# Patient Record
Sex: MALE | Race: WHITE | Employment: PART TIME | URBAN - METROPOLITAN AREA
[De-identification: names, ages, dates, MRNs, and addresses within clinical notes are randomized per-mention and may not be internally consistent; named-entity substitution may affect disease eponyms.]

---

## 2022-12-12 ENCOUNTER — HOSPITAL ENCOUNTER (EMERGENCY)
Age: 52
Discharge: HOME | End: 2022-12-12
Attending: EMERGENCY MEDICINE
Payer: COMMERCIAL

## 2022-12-12 VITALS
BODY MASS INDEX: 16.47 KG/M2 | SYSTOLIC BLOOD PRESSURE: 134 MMHG | DIASTOLIC BLOOD PRESSURE: 80 MMHG | TEMPERATURE: 98 F | HEIGHT: 75 IN | OXYGEN SATURATION: 97 % | RESPIRATION RATE: 20 BRPM | WEIGHT: 132.5 LBS | HEART RATE: 80 BPM

## 2022-12-12 DIAGNOSIS — F41.9 ANXIETY: Primary | ICD-10-CM

## 2022-12-12 PROCEDURE — 6370000000 HC RX 637 (ALT 250 FOR IP): Performed by: EMERGENCY MEDICINE

## 2022-12-12 PROCEDURE — 99283 EMERGENCY DEPT VISIT LOW MDM: CPT

## 2022-12-12 RX ORDER — HYDROXYZINE PAMOATE 25 MG/1
50 CAPSULE ORAL ONCE
Status: COMPLETED | OUTPATIENT
Start: 2022-12-12 | End: 2022-12-12

## 2022-12-12 RX ORDER — HYDROXYZINE HYDROCHLORIDE 25 MG/1
25 TABLET, FILM COATED ORAL EVERY 6 HOURS PRN
Qty: 20 TABLET | Refills: 0 | Status: SHIPPED | OUTPATIENT
Start: 2022-12-12 | End: 2022-12-17

## 2022-12-12 RX ADMIN — HYDROXYZINE PAMOATE 50 MG: 25 CAPSULE ORAL at 21:21

## 2022-12-14 ASSESSMENT — ENCOUNTER SYMPTOMS
SHORTNESS OF BREATH: 0
CHEST TIGHTNESS: 0

## 2022-12-14 NOTE — ED PROVIDER NOTES
[address] [facility]  EMERGENCY DEPARTMENTENCOUNTER      Pt Name: Genaro Mix  MRN: [id number]  [place] 1970  Date ofevaluation: 12/12/2022  Provider: Judy Alba MD    CHIEF COMPLAINT       Chief Complaint   Patient presents with    Anxiety     Pt feeling anxious  this past week         HISTORY OF PRESENT ILLNESS   (Location/Symptom, Timing/Onset,Context/Setting, Quality, Duration, Modifying Factors, Severity)  Note limiting factors. Genaro Mix is a 47 y.o. male  who  has no past medical history on file. who presents to the emergency department for evaluation of anxiety and paranoia. Patient reports that he was concerned because he saw people walking around outside of his apartment and states that he saw the same car drive-by multiple times. He states that he is having intrusive thoughts that someone was following him. Patient acknowledges that he does feel paranoid. States is not current taking medications or seeing any for mental health professionals. Denies illicit drug use. Denies thoughts of self-harm or harm to others. He denies auditory or visual hallucinations. He states that he felt anxious about what he saw earlier and did not want to go back to his apartment. HPI    NursingNotes were reviewed. REVIEW OF SYSTEMS    (2-9 systems for level 4, 10 or more for level 5)     Review of Systems   Constitutional:  Negative for fever. Respiratory:  Negative for chest tightness and shortness of breath. Psychiatric/Behavioral:  Positive for sleep disturbance. Negative for hallucinations, self-injury and suicidal ideas. The patient is nervous/anxious. The patient is not hyperactive. Except as noted above the remainder of the review of systems was reviewed and negative. PAST MEDICAL HISTORY   History reviewed. No pertinent past medical history.       SURGICALHISTORY       Past Surgical History:   Procedure Laterality Date    LEG SURGERY           CURRENT MEDICATIONS       Discharge Medication List as of 12/12/2022 10:25 PM               Penicillins    FAMILY HISTORY     History reviewed. No pertinent family history. SOCIAL HISTORY       Social History     Socioeconomic History    Marital status: Single     Spouse name: None    Number of children: None    Years of education: None    Highest education level: None   Tobacco Use    Smoking status: Every Day     Packs/day: 1.00     Types: Cigarettes   Substance and Sexual Activity    Alcohol use: Yes     Comment: occ    Drug use: No       SCREENINGS             PHYSICAL EXAM    (up to 7 for level 4, 8 or more for level 5)     ED Triage Vitals [12/12/22 2039]   BP Temp Temp Source Heart Rate Resp SpO2 Height Weight   (!) 158/83 98 °F (36.7 °C) Oral (!) 104 18 97 % 6' 2.5\" (1.892 m) 132 lb 8 oz (60.1 kg)       Physical Exam  Vitals reviewed. Constitutional:       General: He is not in acute distress. Appearance: He is well-developed. HENT:      Head: Normocephalic and atraumatic. Eyes:      Conjunctiva/sclera: Conjunctivae normal.   Neck:      Trachea: No tracheal deviation. Cardiovascular:      Rate and Rhythm: Normal rate and regular rhythm. Pulmonary:      Effort: Pulmonary effort is normal.      Breath sounds: Normal breath sounds. No wheezing or rales. Abdominal:      General: There is no distension. Palpations: Abdomen is soft. Tenderness: There is no abdominal tenderness. Musculoskeletal:         General: No deformity. Normal range of motion. Cervical back: Normal range of motion. Skin:     General: Skin is warm and dry. Capillary Refill: Capillary refill takes less than 2 seconds. Neurological:      Mental Status: He is alert and oriented to person, place, and time. Sensory: No sensory deficit. Motor: No weakness.       Gait: Gait normal.       RESULTS     EKG: All EKG's are interpreted by the Emergency Department Physician who either signs or Co-signsthis chart in the absence of a cardiologist.      RADIOLOGY:   [place] such as CT, Ultrasound and MRI are read by the radiologist. Plain radiographic images are visualized and preliminarily interpreted by the emergency physician with the below findings:      Interpretation per the Radiologist below, if available at the time ofthis note:    No orders to display         ED BEDSIDE ULTRASOUND:   Performed by ED Physician - none    LABS:  Labs Reviewed - No data to display    All other labs were within normal range or not returned as of this dictation. EMERGENCY DEPARTMENT COURSE and DIFFERENTIAL DIAGNOSIS/MDM:   Vitals:    Vitals:    12/12/22 2039 12/12/22 2222   BP: (!) 158/83 134/80   Pulse: (!) 104 80   Resp: 18 20   Temp: 98 °F (36.7 °C)    TempSrc: Oral    SpO2: 97% 97%   Weight: 132 lb 8 oz (60.1 kg)    Height: 6' 2.5\" (1.892 m)        Patient was given thefollowing medications:  Medications   hydrOXYzine pamoate (VISTARIL) capsule 50 mg (50 mg Oral Given 12/12/22 2121)       ED COURSE & MEDICAL DECISION MAKING    Pertinent Labs & Imaging studies reviewed. (See chart for details)   -  Patient seen and evaluated in the emergency department. -  Triage and nursing notes reviewed and incorporated. -  Old chart records reviewed and incorporated. -  Differential diagnosis includes: Differential diagnoses: Drug or alcohol use or abuse, psychosis, behavioral mental illness, metabolic disturbance, infection, neurologic emergency, other    -  Work-up included:  See above  -  ED treatment included: See above  -  Results discussed with patient. 52-year-old male presents to the ED for evaluation of paranoia. Reviewing his medical record he been seen at multiple emergency departments for paranoid previously but does not carry a mental health diagnosis. He states that he was taking medication previously was unsure of the name of it. He denies auditory visual hallucinations.   He reports he does feel safe at home but is anxious to go back home because he he saw the same car passed by his apartment multiple times. Patient amenable to trying hydroxyzine to see if it would help with symptoms. On reevaluation he states that he does feel somewhat improved. Discussed with patient when I feels safe with discharge home, or if he feels like he would benefit from hospitalization for mental health evaluation. Patient reports he does feel safe with discharge home and is declining admission. Patient not appear to be a harm to himself or others currently and he believe he is safe for discharge home. patient feels improved on reevaluation. Symptomatic treatment with expectant management discussed with the patient and they and/or family members present are amenable to treatment plan and outpatient follow-up. Strict return precautions were discussed with the patient and those present. They demonstrated understanding of when to return to the emergency department for new or worsening symptoms. .  The patient is agreeable with plan of care and disposition. Is this patient to be included in the SEP-1 Core Measure due to severe sepsis or septic shock? No   Exclusion criteria - the patient is NOT to be included for SEP-1 Core Measure due to: Infection is not suspected      REASSESSMENT          CRITICAL CARE TIME   Total Critical Care time was 0 minutes, excluding separately reportable procedures. There was a high probability of clinically significant/life threatening deterioration in the patient's condition which required my urgent intervention. CONSULTS:  None    PROCEDURES:  Unless otherwise noted below, none     Procedures    FINAL IMPRESSION      1.  Anxiety          DISPOSITION/PLAN   DISPOSITION Decision To Discharge 12/12/2022 09:46:49 PM      PATIENT REFERREDTO:  [address] and [address] [facility]  Call [phone]  Call   As needed    [address]  Call: [phone]  Schedule an appointment as soon as possible for a visit       [address] [place] [address] Pre-Services  [phone]          DISCHARGEMEDICATIONS:  Discharge Medication List as of 12/12/2022 10:25 PM        START taking these medications    Details   hydrOXYzine HCl (ATARAX) 25 MG tablet Take 1 tablet by mouth every 6 hours as needed for Itching, Disp-20 tablet, R-0Print                (Please note that portions of this note were completed with a voice recognition program.  Efforts were made to edit the dictations but occasionally words are mis-transcribed.)    Stanley Nieves MD (electronically signed)  Attending Emergency Physician         Stanley Nieves MD  12/14/22 3848

## 2022-12-17 ENCOUNTER — HOSPITAL ENCOUNTER (EMERGENCY)
Age: 52
Discharge: HOME | End: 2022-12-17
Attending: EMERGENCY MEDICINE
Payer: COMMERCIAL

## 2022-12-17 VITALS
RESPIRATION RATE: 13 BRPM | TEMPERATURE: 97.5 F | SYSTOLIC BLOOD PRESSURE: 104 MMHG | HEIGHT: 75 IN | BODY MASS INDEX: 16.78 KG/M2 | DIASTOLIC BLOOD PRESSURE: 60 MMHG | HEART RATE: 77 BPM | WEIGHT: 135 LBS | OXYGEN SATURATION: 97 %

## 2022-12-17 DIAGNOSIS — F41.1 ANXIETY STATE: Primary | ICD-10-CM

## 2022-12-17 LAB
A/G RATIO: 2.4 (ref 1.1–2.2)
ACETAMINOPHEN LEVEL: <5 UG/ML (ref 10–30)
ALBUMIN SERPL-MCNC: 4.4 G/DL (ref 3.4–5)
ALP BLD-CCNC: 72 U/L (ref 40–129)
ALT SERPL-CCNC: 24 U/L (ref 10–40)
AMPHETAMINE SCREEN, URINE: NORMAL
ANION GAP SERPL CALCULATED.3IONS-SCNC: 13 MMOL/L (ref 3–16)
AST SERPL-CCNC: 21 U/L (ref 15–37)
BARBITURATE SCREEN URINE: NORMAL
BASOPHILS ABSOLUTE: 0 K/UL (ref 0–0.2)
BASOPHILS RELATIVE PERCENT: 0.7 %
BENZODIAZEPINE SCREEN, URINE: NORMAL
BILIRUB SERPL-MCNC: 0.3 MG/DL (ref 0–1)
BILIRUBIN URINE: NEGATIVE
BLOOD, URINE: NEGATIVE
BUN BLDV-MCNC: 16 MG/DL (ref 7–20)
CALCIUM SERPL-MCNC: 9.1 MG/DL (ref 8.3–10.6)
CANNABINOID SCREEN URINE: NORMAL
CHLORIDE BLD-SCNC: 97 MMOL/L (ref 99–110)
CLARITY: CLEAR
CO2: 26 MMOL/L (ref 21–32)
COCAINE METABOLITE SCREEN URINE: NORMAL
COLOR: YELLOW
CREAT SERPL-MCNC: 0.9 MG/DL (ref 0.9–1.3)
EOSINOPHILS ABSOLUTE: 0 K/UL (ref 0–0.6)
EOSINOPHILS RELATIVE PERCENT: 0.7 %
ETHANOL: NORMAL MG/DL (ref 0–0.08)
FENTANYL SCREEN, URINE: NORMAL
GFR SERPL CREATININE-BSD FRML MDRD: >60 ML/MIN/{1.73_M2}
GLUCOSE BLD-MCNC: 124 MG/DL (ref 70–99)
GLUCOSE URINE: NEGATIVE MG/DL
HCT VFR BLD CALC: 36.2 % (ref 40.5–52.5)
HEMOGLOBIN: 12.3 G/DL (ref 13.5–17.5)
KETONES, URINE: NEGATIVE MG/DL
LEUKOCYTE ESTERASE, URINE: NEGATIVE
LYMPHOCYTES ABSOLUTE: 1.1 K/UL (ref 1–5.1)
LYMPHOCYTES RELATIVE PERCENT: 20.7 %
Lab: NORMAL
MAGNESIUM: 2.1 MG/DL (ref 1.8–2.4)
MCH RBC QN AUTO: 33.2 PG (ref 26–34)
MCHC RBC AUTO-ENTMCNC: 34 G/DL (ref 31–36)
MCV RBC AUTO: 97.6 FL (ref 80–100)
METHADONE SCREEN, URINE: NORMAL
MICROSCOPIC EXAMINATION: NORMAL
MONOCYTES ABSOLUTE: 0.4 K/UL (ref 0–1.3)
MONOCYTES RELATIVE PERCENT: 8 %
NEUTROPHILS ABSOLUTE: 3.7 K/UL (ref 1.7–7.7)
NEUTROPHILS RELATIVE PERCENT: 69.9 %
NITRITE, URINE: NEGATIVE
OPIATE SCREEN URINE: NORMAL
OXYCODONE URINE: NORMAL
PDW BLD-RTO: 13.6 % (ref 12.4–15.4)
PH UA: 5.5 (ref 5–8)
PH UA: 6
PHENCYCLIDINE SCREEN URINE: NORMAL
PLATELET # BLD: 237 K/UL (ref 135–450)
PMV BLD AUTO: 7.3 FL (ref 5–10.5)
POTASSIUM REFLEX MAGNESIUM: 3.3 MMOL/L (ref 3.5–5.1)
PROTEIN UA: NEGATIVE MG/DL
RBC # BLD: 3.7 M/UL (ref 4.2–5.9)
SALICYLATE, SERUM: <0.3 MG/DL (ref 15–30)
SARS-COV-2, NAAT: NOT DETECTED
SODIUM BLD-SCNC: 136 MMOL/L (ref 136–145)
SPECIFIC GRAVITY UA: 1.02 (ref 1–1.03)
TOTAL PROTEIN: 6.2 G/DL (ref 6.4–8.2)
URINE REFLEX TO CULTURE: NORMAL
URINE TYPE: NORMAL
UROBILINOGEN, URINE: 0.2 E.U./DL
WBC # BLD: 5.3 K/UL (ref 4–11)

## 2022-12-17 PROCEDURE — 85025 COMPLETE CBC W/AUTO DIFF WBC: CPT

## 2022-12-17 PROCEDURE — 80143 DRUG ASSAY ACETAMINOPHEN: CPT

## 2022-12-17 PROCEDURE — 82077 ASSAY SPEC XCP UR&BREATH IA: CPT

## 2022-12-17 PROCEDURE — 99283 EMERGENCY DEPT VISIT LOW MDM: CPT

## 2022-12-17 PROCEDURE — 6370000000 HC RX 637 (ALT 250 FOR IP): Performed by: PHYSICIAN ASSISTANT

## 2022-12-17 PROCEDURE — 83735 ASSAY OF MAGNESIUM: CPT

## 2022-12-17 PROCEDURE — 80307 DRUG TEST PRSMV CHEM ANLYZR: CPT

## 2022-12-17 PROCEDURE — 80053 COMPREHEN METABOLIC PANEL: CPT

## 2022-12-17 PROCEDURE — 87635 SARS-COV-2 COVID-19 AMP PRB: CPT

## 2022-12-17 PROCEDURE — 81003 URINALYSIS AUTO W/O SCOPE: CPT

## 2022-12-17 PROCEDURE — 80179 DRUG ASSAY SALICYLATE: CPT

## 2022-12-17 RX ORDER — RISPERIDONE 2 MG/1
2 TABLET ORAL NIGHTLY
COMMUNITY
Start: 2022-12-13 | End: 2022-12-17

## 2022-12-17 RX ORDER — HYDROXYZINE PAMOATE 50 MG/1
50 CAPSULE ORAL EVERY 6 HOURS
Qty: 120 CAPSULE | Refills: 0 | Status: SHIPPED | OUTPATIENT
Start: 2022-12-17 | End: 2023-01-16

## 2022-12-17 RX ORDER — RISPERIDONE 1 MG/1
2 TABLET ORAL ONCE
Status: COMPLETED | OUTPATIENT
Start: 2022-12-17 | End: 2022-12-17

## 2022-12-17 RX ORDER — RISPERIDONE 2 MG/1
2 TABLET ORAL NIGHTLY
Qty: 30 TABLET | Refills: 0 | Status: SHIPPED | OUTPATIENT
Start: 2022-12-17

## 2022-12-17 RX ORDER — HYDROXYZINE PAMOATE 25 MG/1
25 CAPSULE ORAL 3 TIMES DAILY PRN
COMMUNITY
Start: 2022-04-23 | End: 2022-12-17

## 2022-12-17 RX ADMIN — RISPERIDONE 2 MG: 1 TABLET ORAL at 08:40

## 2022-12-17 ASSESSMENT — ENCOUNTER SYMPTOMS
COUGH: 0
SHORTNESS OF BREATH: 0
NAUSEA: 0
VOMITING: 0

## 2022-12-17 ASSESSMENT — LIFESTYLE VARIABLES
HOW OFTEN DO YOU HAVE A DRINK CONTAINING ALCOHOL: NEVER
HOW MANY STANDARD DRINKS CONTAINING ALCOHOL DO YOU HAVE ON A TYPICAL DAY: PATIENT DOES NOT DRINK

## 2022-12-17 ASSESSMENT — PAIN - FUNCTIONAL ASSESSMENT
PAIN_FUNCTIONAL_ASSESSMENT: NONE - DENIES PAIN
PAIN_FUNCTIONAL_ASSESSMENT: NONE - DENIES PAIN

## 2022-12-17 NOTE — VIRTUAL HEALTH
[place] Consult to Tele-Psych  Consult performed by: DANIELA Edmond - CNP  Consult ordered by: Poonam, Barbara [facility] EVALUATION    Date of Service: 12/17/2022    Referral Source: Angi Leelaabel, [address] (ED staff)  History  From: Patient  Record Review: moderate      CC: \"I just have so much anxiety, man. \"     HPI:   The patient is a 47 y.o. male who presents to the Emergency Department for severe anxiety. Reports that he's suffered from anxiety for the past 1-1.5 years but does not elaborate on any specific events that took place at that time as being the catalyst for his anxiety. He presented to ED at [facility] on 12/13/2022 and did describe feeling paranoid and as though \"someone\" was after him, but did not specify anyone specifically. He does not mention any paranoia today and only describes anxiety being his primary symptom. He explains that he was discharged from [address] [place] [address] ED with Risperdal 2mg qhs for his paranoia and Vistaril 25mg q 6 hours for anxiety. He was, however, evidently unable to get this script filled because his insurance would not fill something that came from an ED rather than a primary care doctor. Upon contacting his insurance through [place], they apparently recommended that obtain transportation to the ED in order to be evaluated for his anxiety. SI: Denies; reports that he has no interest in harming himself or anyone else and is only interested in having his anxiety reduced. HI: Denies  AH/VH: Denies    Pt endorse seizures when he was using alcohol and other drugs in 2018 but not since that time, denies TBIs, Hep C or HIV  No TD noted, AIMS=0    Psychiatric history:  Prior psychiatric dx: Reports that he's never received treatment for his symptoms on an outpatient basis. Admits that it might be good for him.    Outpatient psychiatric provider: He was apparently assessed at [facility] mid-December, 2022; however, they apparently informed him that it would be at least 30 days before he could be assessed by a counselor there. Suicide attempts: Denies/none per history  Inpatient psychiatric admissions: Believes that he had one at [facility] [place] [place] in 2018 when he was using alcohol. This was right before he went into rehab for his alcoholism. Past psychiatric medication trials: Only mentions that he's used Vistaril before and that it never worked for him. Violence/aggression: Denies/no history noted. Prior ANTON treatment: Rehab in 2018. Reports that he's been clean from substances since that time. Family history:   Family history of suicide attempt: Reports that he was adopted upon birth and that it was a closed adopted. He is unable to provide any information pertaining to his family's medical history. Family history of mental illness: See above. Substance Abuse History (over the past 12 months, unless otherwise specified): Tobacco: Endorses . 5 pack a day  Caffeine: Reports that he drinks 1-2 cups of coffee a day. Will also sometimes drink an energy drink. Alcohol: Denies  Marijuana: Denies  Cocaine: Denies  Opiates: Denies  Benzodiazepine: Denies  Other illicit drug usage: Denies    Social History:  Childhood: Describes his childhood as pleasant. He was adopted at birth. Psychological trauma or Abuse: Denies   Living Situation/Interest: States that he lives in an apartment in [facility]. 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 At time of assessment, he's not working but reports that he intends to obtain a job and is reportedly in the process of passing a drug test for said company. Legal History: Unremarkable  Spiritual History: Did not elaborate    Allergies: Allergies   Allergen Reactions    Penicillins         Past Medical History:      History reviewed. No pertinent past medical history.     Past Surgical History:      Past Surgical History: Procedure Laterality Date    LEG SURGERY         Current medications:    No current facility-administered medications for this encounter.     Current Outpatient Medications:     risperiDONE (RISPERDAL) 2 MG tablet, Take 2 mg by mouth nightly, Disp: , Rfl:     hydrOXYzine HCl (ATARAX) 25 MG tablet, Take 1 tablet by mouth every 6 hours as needed for Itching, Disp: 20 tablet, Rfl: 0      Vital signs:   Vitals:    12/17/22 1300   BP: 101/63   Pulse: 70   Resp: 13   Temp:    SpO2: 97%         Labs:   Recent Results (from the past 48 hour(s))   CBC with Auto Differential    Collection Time: 12/17/22  7:19 AM   Result Value Ref Range    WBC 5.3 4.0 - 11.0 K/uL    RBC 3.70 (L) 4.20 - 5.90 M/uL    Hemoglobin 12.3 (L) 13.5 - 17.5 g/dL    Hematocrit 36.2 (L) 40.5 - 52.5 %    MCV 97.6 80.0 - 100.0 fL    MCH 33.2 26.0 - 34.0 pg    MCHC 34.0 31.0 - 36.0 g/dL    RDW 13.6 12.4 - 15.4 %    Platelets 299 118 - 920 K/uL    MPV 7.3 5.0 - 10.5 fL    Neutrophils % 69.9 %    Lymphocytes % 20.7 %    Monocytes % 8.0 %    Eosinophils % 0.7 %    Basophils % 0.7 %    Neutrophils Absolute 3.7 1.7 - 7.7 K/uL    Lymphocytes Absolute 1.1 1.0 - 5.1 K/uL    Monocytes Absolute 0.4 0.0 - 1.3 K/uL    Eosinophils Absolute 0.0 0.0 - 0.6 K/uL    Basophils Absolute 0.0 0.0 - 0.2 K/uL   Comprehensive Metabolic Panel w/ Reflex to MG    Collection Time: 12/17/22  7:19 AM   Result Value Ref Range    Sodium 136 136 - 145 mmol/L    Potassium reflex Magnesium 3.3 (L) 3.5 - 5.1 mmol/L    Chloride 97 (L) 99 - 110 mmol/L    CO2 26 21 - 32 mmol/L    Anion Gap 13 3 - 16    Glucose 124 (H) 70 - 99 mg/dL    BUN 16 7 - 20 mg/dL    Creatinine 0.9 0.9 - 1.3 mg/dL    Est, Glom Filt Rate >60 >60    Calcium 9.1 8.3 - 10.6 mg/dL    Total Protein 6.2 (L) 6.4 - 8.2 g/dL    Albumin 4.4 3.4 - 5.0 g/dL    Albumin/Globulin Ratio 2.4 (H) 1.1 - 2.2    Total Bilirubin 0.3 0.0 - 1.0 mg/dL    Alkaline Phosphatase 72 40 - 129 U/L    ALT 24 10 - 40 U/L    AST 21 15 - 37 U/L   Ethanol Collection Time: 12/17/22  7:19 AM   Result Value Ref Range    Ethanol Lvl None Detected mg/dL   Salicylate    Collection Time: 12/17/22  7:19 AM   Result Value Ref Range    Salicylate, Serum <4.8 (L) 15.0 - 30.0 mg/dL   Acetaminophen Level    Collection Time: 12/17/22  7:19 AM   Result Value Ref Range    Acetaminophen Level <5 (L) 10 - 30 ug/mL   Magnesium    Collection Time: 12/17/22  7:19 AM   Result Value Ref Range    Magnesium 2.10 1.80 - 2.40 mg/dL   Urine Drug Screen    Collection Time: 12/17/22  9:01 AM   Result Value Ref Range    Amphetamine Screen, Urine Neg Negative <1000ng/mL    Barbiturate Screen, Ur Neg Negative <200 ng/mL    Benzodiazepine Screen, Urine Neg Negative <200 ng/mL    Cannabinoid Scrn, Ur Neg Negative <50 ng/mL    Cocaine Metabolite Screen, Urine Neg Negative <300 ng/mL    Opiate Scrn, Ur Neg Negative <300 ng/mL    PCP Screen, Urine Neg Negative <25 ng/mL    Methadone Screen, Urine Neg Negative <300 ng/mL    Oxycodone Urine Neg Negative <100 ng/ml    FENTANYL SCREEN, URINE Neg Negative <5 ng/mL    pH, UA 6.0     Drug Screen Comment: see below    Urinalysis with Reflex to Culture    Collection Time: 12/17/22  9:01 AM    Specimen: Urine   Result Value Ref Range    Color, UA Yellow Straw/Yellow    Clarity, UA Clear Clear    Glucose, Ur Negative Negative mg/dL    Bilirubin Urine Negative Negative    Ketones, Urine Negative Negative mg/dL    Specific Gravity, UA 1.021 1.005 - 1.030    Blood, Urine Negative Negative    pH, UA 5.5 5.0 - 8.0    Protein, UA Negative Negative mg/dL    Urobilinogen, Urine 0.2 <2.0 E.U./dL    Nitrite, Urine Negative Negative    Leukocyte Esterase, Urine Negative Negative    Microscopic Examination Not Indicated     Urine Type NotGiven     Urine Reflex to Culture Not Indicated    COVID-19, Rapid    Collection Time: 12/17/22  9:01 AM    Specimen: Nasopharyngeal Swab   Result Value Ref Range    SARS-CoV-2, NAAT Not Detected Not Detected         MSE:   Sensorium  oriented to time, place and person   Orientation person, place, time/date, situation, day of week, month of year, and year   Relations cooperative   Eye Contact appropriate   Appearance:  age appropriate, bearded, disheveled, and thin & gaunt looking   Motor Behavior:  within normal limits   Speech:  normal pitch and normal volume   Vocabulary average   Thought Process: within normal limits   Thought Content normal    Suicidal ideations no plan , no intention, and none   Homicidal ideations no plan , no intention, and none   Mood:  anxious   Affect:  normal and mood-congruent   Memory recent  adequate   Memory remote:  adequate   Concentration:  adequate   Abstraction:  abstract   Insight:  fair   Reliability fair   Judgment:  fair         C-SSRS Suicide Screening 12/17/2022   1) Within the past month, have you wished you were dead or wished you could go to sleep and not wake up? No   2) Have you actually had any thoughts of killing yourself? No   6) Have you ever done anything, started to do anything, or prepared to do anything to end your life? No        Impression:     Patient does not meet criteria for inpatient hospitalization. Patient and case discussed with collaborating psychiatrist, Dr. Jeremy Curran and team is recommending discharge at this time. Plan:    Recommend discharge as patient does not meet inpatient criteria at this time  Recommend an increase in Vistaril 50mg q 6 hours. Risperdal 2mg 1 tab qhs can remain the same. If possible, recommend that ER provide the patient with a 30-day supply of these medications for him to use until he is established with outpatient treatment. The patient verbalized understanding and agreed with the treatment regimen as outlined above. Medical records, labs, diagnostic tests reviewed  Pt had an opportunity to ask questions and address concerns  Pt encouraged to continue outpatient therapy  Pt was in agreement with treatment plan.   Patient is ok to be dc from a psychiatric point of view when medically appropriate. Patient does not meet criteria for a psychiatric hold at this time  Patient is to follow up with outpatient psychiatry; please provide with patient with local resources as he needs to get established with psychiatric mental health treatment. Psychiatry will sign off  Thank you for this consult    Manus Constant was evaluated through a synchronous (real-time) audio-video encounter. The patient (or guardian if applicable) is aware that this is a billable service. Verbal consent to proceed has been obtained. Patient identification was verified, and a caregiver was present when appropriate. The patient was located at [facility] (Appt Department): [address]: [phone]    The provider was located at [place], [place]. Total time spent on this encounter: Not billed by time      --DANIELA Parker CNP on 12/17/2022 at 1:03 PM    An electronic signature was used to authenticate this note.

## 2022-12-17 NOTE — ED PROVIDER NOTES
Attending Supervisory Note/Shared Visit   I have personally performed a face to face diagnostic evaluation on this patient. I have reviewed the mid-levels findings and agree. History and Exam by me shows alert white male no acute distress. Presents complaining of anxiety. This patient has a previous history of alcohol dependency and drug use. Has not been using for several years. Moved from [place] to the [address] [place] [address] recently because of anxiety related to friend of his that was killed. [place] that he would be safer and [facility]. Unfortunately he has anxiety has not abated. He is paranoid. He feels like people are trying to get into his apartment. He has been seen several times for symptoms, most recently at [facility] on 12/13/2022. Apparently they prescribed some Risperdal, but for some reason related to his insurance being [facility] he was unable to get it filled. General: Alert male in no acute distress. Head: Atraumatic and normocephalic. Eyes: Pupils equal round reactive. Neck Dr. Movements are intact. ENT: Ma Junaid is clear. Oropharynx moist without erythema. Neck: Supple, nontender, no adenopathy. Heart: Regular rate and rhythm. No murmurs or gallops noted. Lungs: Breath sounds equal bilaterally and clear. Mental status: Alert, oriented. Paranoid. Reporting thoughts that people were trying to get into his apartment. At times he feels like he sees or hears things in his apartment, but seems to have an understanding that they are not really there. He is eating and drinking. His sleep patterns are fair. He states he is able to get some sleep. He is not suicidal homicidal.    Lab reviewed. Drug screen negative. Urinalysis is unremarkable. COVID-negative. Influenza negative. H&H of 12.3 and 36.2. White blood count 5300. Sodium 136 with a potassium of 3.3. BUN and creatinine are normal.  Glucose of 124. Liver enzymes are normal.  No alcohol detected. Salicylate level less than 0.3. Acetaminophen level less than 5. Telepsych evaluation has been completed. They recommend outpatient treatment with follow-up. We will prescribe Vistaril 50 mg every 6 hours and Risperdal 2 mg nightly per the recommendation. Outpatient follow-up per their recommendation.     1. Anxiety state      Disposition:  Discharge / Home      (Please note that portions of this note were completed with a voice recognition program.  Efforts were made to edit the dictations but occasionally words are mis-transcribed.)    Arneta Ormond, MD  Attending Emergency Physician       Milan Velazquez MD  12/17/22 1183

## 2022-12-17 NOTE — ED TRIAGE NOTES
Pt states he has anxiety, pt presents as fidgety and talkative.  Pt states tried to get rx filled for risperidone but wasn't able to get it at  pharmacy, needs to be written by MD.

## 2022-12-17 NOTE — ED PROVIDER NOTES
[address] [facility]        Pt Name: Yasemin Lopez  MRN: [id number]  [place] 1970  Date of evaluation: 12/17/2022  Provider: YOSHI Onofre  PCP: No primary care provider on file. Note Started: 8:14 AM EST 12/17/22       I have seen and evaluated this patient with my supervising physician Trista Travis MD.      CHIEF COMPLAINT       Chief Complaint   Patient presents with    Panic Attack     Pt arrived via ems stating \" having an anxiety attack. \"       HISTORY OF PRESENT ILLNESS: 1 or more Elements     History from : Patient    Limitations to history : None    Yasemin Lopez is a 47 y.o. male who presents to the emergency department today with concerns for paranoia, anxiety. Patient reports that he has been struggling with a lot of anxiety and panic for the last several months. He has been sober from alcohol, cocaine since about 2018. He states when he went to rehab he was taught several coping mechanisms, but thinks that over time things have kind of worn away and he is having a more difficult time coping. He states he went to psychiatric emergency services a few days ago and prescribed Risperdal, but when he tried to fill the prescription that was written by an [place] licensed provider in [place] he was unable to get it filled. He states he is not having thoughts of harming himself or others, but he is getting frustrated with the amount of anxiety that is increasing and his decreased ability to cope with it. He presents today, hopeful that he can get a prescription for the Risperdal, but ideally would prefer not to be admitted because he has a new job starting on Monday. He has no further complaints. Nursing Notes were all reviewed and agreed with or any disagreements were addressed in the HPI. REVIEW OF SYSTEMS :      Review of Systems   Constitutional:  Negative for chills and fever.    Respiratory:  Negative for cough and shortness of breath. Cardiovascular:  Negative for chest pain and palpitations. Gastrointestinal:  Negative for nausea and vomiting. Neurological:  Negative for dizziness and headaches. Psychiatric/Behavioral:  Negative for agitation, confusion, self-injury and suicidal ideas. The patient is nervous/anxious. The patient is not hyperactive. Positives and Pertinent negatives as per HPI. SURGICAL HISTORY     Past Surgical History:   Procedure Laterality Date    LEG SURGERY         CURRENTMEDICATIONS       Previous Medications    No medications on file       ALLERGIES     Penicillins    FAMILYHISTORY     History reviewed. No pertinent family history. SOCIAL HISTORY       Social History     Tobacco Use    Smoking status: Every Day     Packs/day: 1.00     Types: Cigarettes   Substance Use Topics    Alcohol use: Yes     Comment: occ    Drug use: No       SCREENINGS        [place] Coma Scale  Eye Opening: Spontaneous  Best Verbal Response: Oriented  Best Motor Response: Obeys commands  Perez Coma Scale Score: 15                CIWA Assessment  BP: 101/63  Heart Rate: 70           PHYSICAL EXAM  1 or more Elements     ED Triage Vitals [12/17/22 0616]   BP Temp Temp Source Heart Rate Resp SpO2 Height Weight   121/72 97.5 °F (36.4 °C) Oral 94 16 98 % 6' 2.5\" (1.892 m) 135 lb (61.2 kg)       Physical Exam  Vitals and nursing note reviewed. Constitutional:       General: He is not in acute distress. Appearance: Normal appearance. He is well-developed. He is not ill-appearing, toxic-appearing or diaphoretic. HENT:      Head: Normocephalic and atraumatic. Mouth/Throat:      Mouth: Mucous membranes are moist.      Pharynx: Oropharynx is clear. Eyes:      Conjunctiva/sclera: Conjunctivae normal.      Pupils: Pupils are equal, round, and reactive to light. Cardiovascular:      Rate and Rhythm: Normal rate and regular rhythm.    Pulmonary:      Effort: Pulmonary effort is normal. No respiratory distress. Breath sounds: Normal breath sounds. Abdominal:      General: Bowel sounds are normal. There is no distension. Palpations: Abdomen is soft. Tenderness: There is no abdominal tenderness. Musculoskeletal:      Cervical back: Normal range of motion and neck supple. Right lower leg: No edema. Left lower leg: No edema. Skin:     General: Skin is warm and dry. Neurological:      General: No focal deficit present. Mental Status: He is alert and oriented to person, place, and time. Psychiatric:         Attention and Perception: Attention and perception normal.         Mood and Affect: Mood is anxious. Affect is not labile, angry or inappropriate. Speech: Speech normal.         Behavior: Behavior normal. Behavior is not aggressive, withdrawn or hyperactive. Behavior is cooperative. Thought Content: Thought content is not delusional. Thought content does not include homicidal or suicidal ideation. Thought content does not include homicidal or suicidal plan. Cognition and Memory: Cognition and memory normal.         Judgment: Judgment normal. Judgment is not impulsive or inappropriate.          DIAGNOSTIC RESULTS   LABS:    Labs Reviewed   CBC WITH AUTO DIFFERENTIAL - Abnormal; Notable for the following components:       Result Value    RBC 3.70 (*)     Hemoglobin 12.3 (*)     Hematocrit 36.2 (*)     All other components within normal limits   COMPREHENSIVE METABOLIC PANEL W/ REFLEX TO MG FOR LOW K - Abnormal; Notable for the following components:    Potassium reflex Magnesium 3.3 (*)     Chloride 97 (*)     Glucose 124 (*)     Total Protein 6.2 (*)     Albumin/Globulin Ratio 2.4 (*)     All other components within normal limits   SALICYLATE LEVEL - Abnormal; Notable for the following components:    Salicylate, Serum <8.4 (*)     All other components within normal limits   ACETAMINOPHEN LEVEL - Abnormal; Notable for the following components: Acetaminophen Level <5 (*)     All other components within normal limits   COVID-19, RAPID   URINE DRUG SCREEN   URINALYSIS WITH REFLEX TO CULTURE   ETHANOL   MAGNESIUM       When ordered only abnormal lab results are displayed. All other labs were within normal range or not returned as of this dictation. EKG: When ordered, EKG's are interpreted by the Emergency Department Physician in the absence of a cardiologist.  Please see their note for interpretation of EKG. RADIOLOGY:   Non-plain film images such as CT, Ultrasound and MRI are read by the radiologist. Plain radiographic images are visualized and preliminarily interpreted by the ED Provider with the below findings:    Interpretation per the Radiologist below, if available at the time of this note:    No orders to display     No results found. No results found. PROCEDURES   Unless otherwise noted below, none     Procedures    PAST MEDICAL HISTORY      has no past medical history on file. EMERGENCY DEPARTMENT COURSE and DIFFERENTIAL DIAGNOSIS/MDM:   Vitals:    Vitals:    12/17/22 1130 12/17/22 1145 12/17/22 1200 12/17/22 1300   BP:  (!) 100/56 99/64 101/63   Pulse: 69 72 69 70   Resp: 13 17 11 13   Temp:       TempSrc:       SpO2: 95% 98% 99% 97%   Weight:       Height:           Patient was given the following medications:  Medications   risperiDONE (RISPERDAL) tablet 2 mg (2 mg Oral Given 12/17/22 0840)             Is this patient to be included in the SEP-1 Core Measure due to severe sepsis or septic shock? No   Exclusion criteria - the patient is NOT to be included for SEP-1 Core Measure due to: Infection is not suspected    ED COURSE & MEDICAL DECISION MAKING    - The patient presented to the ER with complaints of anxiety, panic attack. Vital signs were reviewed. Exam as above. Peripheral IV placed. Labs ordered. - Pertinent Labs & Imaging studies reviewed.  (See chart for details)   -  Patient seen and evaluated in the emergency department. -  Triage and nursing notes reviewed and incorporated. -  Old chart records reviewed and incorporated. Patient was seen on 12/13 at [facility], where he presented voluntarily for worsening anxiety not alleviated with previously prescribed hydroxyzine. -   I have seen and evaluated this patient with my supervising physician Hoda Martinez MD.  -  Differential diagnosis includes: Paranoia, psychosis, intoxication, electrolyte disturbance, depression, anxiety, other  -  Work-up included:  See above  -  ED treatment included:  risperdal 2mg PO  - Consults: Psychiatry - Per TelePsych note, patient does not meet inpatient criteria at this time, with recommendations for outpatient follow-up. They did recommend that the patient get an increase in his Vistaril 50 mg every 6 hours, Resporal 2 mg 1 tab at night, and that he follow-up with outpatient psychiatry. -  Results discussed with patient and/or family. Labs show no leukocytosis or concerning anemia, metabolic panel with no concerning abnormalities. Salicylate, acetaminophen, and ethanol levels are not elevated. Urinalysis is negative, urine drug screen is negative, COVID test is negative. At this time, we recommend discharge, as the patient is stable for outpatient management. These prescriptions will be provided to him per EDMD.  Urgent referrals to primary care and psychiatry were provided. The patient and/or family is agreeable with plan of care and disposition.  -  Disposition:  Home in stable condition  - Critical Care: The total critical care time I independently spent while evaluating and treating this patient was 31 minutes. This excludes time spent doing separately billable procedures. This includes time at the bedside, data interpretation, medication management, obtaining critical history from collateral sources if the patient is unable to provide it directly, and physician consultation.   Specifics of interventions taken and potentially life-threatening diagnostic considerations are listed above in the medical decision making. If this was a shared visit with an physician, the time in this attestation is non-concurrent critical care time out of the total shared critical care time provided by the physician and myself. FINAL IMPRESSION      1. Anxiety state          DISPOSITION/PLAN     DISPOSITION Decision To Discharge 12/17/2022 02:44:34 PM      PATIENT REFERRED TO:  [facility]) Pre-Services  [address] [facility] [address] [address]  [address] [facility] ?   [phone]  OR [phone]  Go to   for a re-check in  1-2  days. Walk In appointments are available to you.     DISCHARGE MEDICATIONS:  New Prescriptions    HYDROXYZINE PAMOATE (VISTARIL) 50 MG CAPSULE    Take 1 capsule by mouth every 6 hours    RISPERIDONE (RISPERDAL) 2 MG TABLET    Take 1 tablet by mouth nightly       DISCONTINUED MEDICATIONS:  Discontinued Medications    HYDROXYZINE HCL (ATARAX) 25 MG TABLET    Take 1 tablet by mouth every 6 hours as needed for Itching    HYDROXYZINE PAMOATE (VISTARIL) 25 MG CAPSULE    Take 25 mg by mouth 3 times daily as needed    RISPERIDONE (RISPERDAL) 2 MG TABLET    Take 2 mg by mouth nightly              (Please note that portions of this note were completed with a voice recognition program.  Efforts were made to edit the dictations but occasionally words are mis-transcribed.)    YOSHI Aleman (electronically signed)            Cheyenne Aleman  12/17/22 5050

## 2023-01-10 ENCOUNTER — HOSPITAL ENCOUNTER (EMERGENCY)
Age: 53
Discharge: HOME | End: 2023-01-10
Payer: COMMERCIAL

## 2023-01-10 VITALS
SYSTOLIC BLOOD PRESSURE: 130 MMHG | RESPIRATION RATE: 18 BRPM | OXYGEN SATURATION: 95 % | HEART RATE: 95 BPM | TEMPERATURE: 98.7 F | DIASTOLIC BLOOD PRESSURE: 65 MMHG

## 2023-01-10 DIAGNOSIS — F41.9 ANXIETY: Primary | ICD-10-CM

## 2023-01-10 PROCEDURE — 99283 EMERGENCY DEPT VISIT LOW MDM: CPT

## 2023-01-10 PROCEDURE — 6370000000 HC RX 637 (ALT 250 FOR IP): Performed by: PHYSICIAN ASSISTANT

## 2023-01-10 RX ORDER — RISPERIDONE 1 MG/1
2 TABLET ORAL ONCE
Status: COMPLETED | OUTPATIENT
Start: 2023-01-10 | End: 2023-01-10

## 2023-01-10 RX ORDER — HYDROXYZINE PAMOATE 25 MG/1
25 CAPSULE ORAL ONCE
Status: COMPLETED | OUTPATIENT
Start: 2023-01-10 | End: 2023-01-10

## 2023-01-10 RX ADMIN — HYDROXYZINE PAMOATE 25 MG: 25 CAPSULE ORAL at 22:29

## 2023-01-10 RX ADMIN — RISPERIDONE 2 MG: 1 TABLET ORAL at 22:29

## 2023-01-11 ASSESSMENT — ENCOUNTER SYMPTOMS
ABDOMINAL PAIN: 0
COLOR CHANGE: 0
SHORTNESS OF BREATH: 0
VOMITING: 0

## 2023-01-11 NOTE — ED NOTES
D/C: Order noted for d/c. Pt confirmed d/c paperwork  have correct name. Discharge and education instructions reviewed with patient. Teach-back successful. Pt verbalized understanding and signed d/c papers. Pt denied questions at this time. No acute distress noted. Patient instructed to follow-up as noted - return to emergency department if symptoms worsen. Patient verbalized understanding. Discharged per EDMD with discharge instructions. Pt discharged  to private vehicle. Patient stable upon departure. Thanked patient for choosing [facility]) for care.           Rambo Adam RN  01/10/23 8493

## 2023-01-11 NOTE — ED PROVIDER NOTES
[address] [facility]        Pt Name: Valerie Green  MRN: [id number]  [place] 1970  Date of evaluation: 1/10/2023  Provider: YOSHI Portillo  PCP: No primary care provider on file. Note Started: 2:33 AM EST 1/11/23      KAREY. I have evaluated this patient. My supervising physician was available for consultation. CHIEF COMPLAINT       Chief Complaint   Patient presents with    Anxiety     X2 hours; hx of anxiety and takes vistaril last dose was 3pm       HISTORY OF PRESENT ILLNESS: 1 or more Elements     History from : Patient    Limitations to history : None    Valerie Green is a 47 y.o. male who presents in anxiety. He tells me has history of the same. He denies prior diagnosis of bipolar disorder or schizophrenia. He tells me he was post to see his therapist today but did not get to see her because Ariel Kay was not there. \"  He tells me he took his Vistaril last at 3 PM.  He was on Risperdal all at bedtime he tells me he has not had it yet tonight. He tells me he lives in an apartment at home. He denies suicidal or homicidal ideation. No chest pain or shortness of breath. Nursing Notes were all reviewed and agreed with or any disagreements were addressed in the HPI. REVIEW OF SYSTEMS :      Review of Systems   Constitutional:  Negative for fever. Respiratory:  Negative for shortness of breath. Cardiovascular:  Negative for chest pain. Gastrointestinal:  Negative for abdominal pain and vomiting. Skin:  Negative for color change and wound. Neurological:  Negative for weakness. Psychiatric/Behavioral:  Negative for agitation, hallucinations and suicidal ideas. The patient is nervous/anxious. Positives and Pertinent negatives as per HPI.      SURGICAL HISTORY     Past Surgical History:   Procedure Laterality Date    LEG SURGERY         CURRENTMEDICATIONS       Discharge Medication List as of 1/10/2023 10:34 PM     CONTINUE these medications which have NOT CHANGED    Details   hydrOXYzine pamoate (VISTARIL) 50 MG capsule Take 1 capsule by mouth every 6 hours, Disp-120 capsule, R-0Print      risperiDONE (RISPERDAL) 2 MG tablet Take 1 tablet by mouth nightly, Disp-30 tablet, R-0Print             ALLERGIES     Penicillins    FAMILYHISTORY     History reviewed. No pertinent family history.     SOCIAL HISTORY       Social History     Tobacco Use    Smoking status: Every Day     Packs/day: 1.00     Types: Cigarettes   Substance Use Topics    Alcohol use: Yes     Comment: occ    Drug use: No       SCREENINGS        [place] Coma Scale  Eye Opening: Spontaneous  Best Verbal Response: Oriented  Best Motor Response: Obeys commands  Perez Coma Scale Score: 15                CIWA Assessment  BP: 130/65  Heart Rate: 95           PHYSICAL EXAM  1 or more Elements     ED Triage Vitals [01/10/23 2215]   BP Temp Temp Source Heart Rate Resp SpO2 Height Weight   130/65 98.7 °F (37.1 °C) Oral 95 18 95 % -- --       Physical Exam  Vitals and nursing note reviewed.   Constitutional:       Appearance: Normal appearance.   HENT:      Head: Normocephalic and atraumatic.      Mouth/Throat:      Mouth: Mucous membranes are moist.   Eyes:      Pupils: Pupils are equal, round, and reactive to light.   Cardiovascular:      Rate and Rhythm: Normal rate.   Pulmonary:      Effort: Pulmonary effort is normal. No respiratory distress.   Abdominal:      Tenderness: There is no abdominal tenderness. There is no guarding.   Musculoskeletal:         General: Normal range of motion.      Cervical back: Normal range of motion.   Skin:     General: Skin is warm.   Neurological:      General: No focal deficit present.      Mental Status: He is alert and oriented to person, place, and time.   Psychiatric:         Mood and Affect: Mood normal.         Behavior: Behavior normal.       DIAGNOSTIC RESULTS   LABS:    Labs Reviewed - No data to display    When ordered  only abnormal lab results are displayed. All other labs were within normal range or not returned as of this dictation. EKG: When ordered, EKG's are interpreted by the Emergency Department Physician in the absence of a cardiologist.  Please see their note for interpretation of EKG. RADIOLOGY:   Non-plain film images such as CT, Ultrasound and MRI are read by the radiologist. Plain radiographic images are visualized and preliminarily interpreted by the ED Provider with the below findings:    Interpretation per the Radiologist below, if available at the time of this note:    No orders to display     No results found. No results found. PROCEDURES   Unless otherwise noted below, none     Procedures    CRITICAL CARE TIME (.cctime)   I performed a total Critical Care time of  0 minutes, excluding separately reportable procedures. There was a high probability of clinically significant/life threatening deterioration in the patient's condition which required my urgent intervention. Not limited to multiple reexaminations, discussions with attending physician and consultants. PAST MEDICAL HISTORY      has no past medical history on file. EMERGENCY DEPARTMENT COURSE and DIFFERENTIAL DIAGNOSIS/MDM:   Vitals:    Vitals:    01/10/23 2215   BP: 130/65   Pulse: 95   Resp: 18   Temp: 98.7 °F (37.1 °C)   TempSrc: Oral   SpO2: 95%       Patient was given the following medications:  Medications   risperiDONE (RISPERDAL) tablet 2 mg (2 mg Oral Given 1/10/23 2229)   hydrOXYzine pamoate (VISTARIL) capsule 25 mg (25 mg Oral Given 1/10/23 2229)             Is this patient to be included in the SEP-1 Core Measure due to severe sepsis or septic shock? No   Exclusion criteria - the patient is NOT to be included for SEP-1 Core Measure due to:   Infection is not suspected    CONSULTS: (Who and What was discussed)  None  Discussion with Other Profesionals : None    Social Determinants : None      CC/HPI Summary, DDx, ED Course, and Reassessment: As me for the last couple of hours he is felt anxious. He last took his Vistaril at 3 PM.  He did not take his nighttime dose of Risperdal.  He is afebrile not tachycardic not hypoxic. He denies suicidal or homicidal ideation. No chest pain or shortness of breath. Discharged with instructions to follow-up with therapy/psychiatry and take medications as prescribed. I discussed with Gayatri Randall and/or family the exam results, diagnosis, care, prognosis, reasons to return and the importance of follow up. Patient and/or family is in full agreement with plan and all questions have been answered. Specific discharge instructions explained, including reasons to return to the emergency department. Gayatri Randall is well appearing, non-toxic, and afebrile at the time of discharge. I estimate there is LOW risk for SUICIDAL BEHAVIOR, HOMICIDAL BEHAVIOR, PSYCHOSIS, DANGEROUS OR VIOLENT BEHAVIOR, DISORIENTATION, OR MENTAL HEALTH CONDITION REQUIRING HOSPITALIZATION, thus I consider the discharge disposition reasonable. I am the Primary Clinician of Record. FINAL IMPRESSION      1.  Anxiety          DISPOSITION/PLAN     DISPOSITION Decision To Discharge 01/10/2023 10:24:11 PM      PATIENT REFERRED TO:  Chidi Kumar  [phone]          DISCHARGE MEDICATIONS:  Discharge Medication List as of 1/10/2023 10:34 PM          DISCONTINUED MEDICATIONS:  Discharge Medication List as of 1/10/2023 10:34 PM                 (Please note that portions of this note were completed with a voice recognition program.  Efforts were made to edit the dictations but occasionally words are mis-transcribed.)    YOSHI Garcia (electronically signed)            YOSHI Garcia  01/11/23 8767

## 2023-01-11 NOTE — DISCHARGE INSTRUCTIONS
[facility] Access Point: All initial referrals for community mental health services must go through [facility]. Crisis Intervention  Crisis Hotline:  [phone]([facility])  Adult Mobile Crisis [phone] (through [facility]MAR [facility])   [address], 8000 [facility]  [facility]. Phone: [phone] Fax: [phone]  Director: TACOS Melissa LISW-S  Website:  [email or web address]  General Information: As the front door to the [facility], [address] [place] [address] ([facility]), a division of [address] [place] [address], provides assessment, support, and connections for individuals and families who are in need of mental health services. [facility] primary mission is to provide a standardized entry to a managed system of care that ensures services are available, accessible, and of high quality. [facility] operates 24 hours a day, seven days a week to answer calls and connect consumers to appropriate services. To access [facility], call [phone].   Primary Functions: [facility]'s primary functions are:  Assessments, authorization for and connection to appropriate mental health services   Utilization review and monitoring of publicly-funded mental health services   Quality improvement and assurance through clinical and statistical review   [facility] to provide benefits to consumers entering the public mental health system  Available Services: As the front door for access into public mental health services in [place] [address] provides the following services:  Mental health assessments   Transitional case management and medication management services for children and adults who have immediate mental health needs so consumers can experience a seamless progression of service delivery   Triage of mental health referrals from community providers for intensive services   Housing assessments for adult consumers currently connected to case management services requesting to move into Adult Care Homes   Application assistance for medical and disability benefits (Medicaid and Social Security)  FAIR: As part of a collaborative effort between the [address] 50 and [address] and the [address] [facility] ([facility]) as well as a partnership between [facility] became operational on February 1, 2010. HOPE: HOPE for Children and Families (HOPE) is a multi-system collaboration and is responsible for the referral, connection, monitoring and coordination of services for children and youth who are involved in two or more [place] [address] (several locations throughout city)  Many services offered: counseling, psychiatry, case management, housing assist  [facility],2Nd Floor  [phone]    [address] [place] [address],9D ([phone]) call central registration for appointment, 5 locations available    [address] [place] [address] (located at [address] [place] [address])  [address] [address]  [phone]-for general information and all services (M-F 8:30-5)  [phone]- adult outpatient intake    [facility] (they have many locations throughout the [facility], call for office nearest you or office that can offer the first appointment)  [address] [place] [address] [phone]    YYRVUYB MJJKN (numerous programs including residential, FCI house, substance abuse, adolescent and adult)  [phone]  [facility]  Website:  https://www.[employer]/  Phone:  [phone] ([phone]) for more information or to schedule an appointment. [phone] ([phone]) or text Bambi Govern to [phone] (textme) if in crisis or in need of immediate assistance. Available 24 hours-a-day. Location:  Executive Office location is:  [address] , [address] [facility]  Phone: [phone]  Fax: [phone]   Uziel Sherman is a community-wide nonprofit that operates within five service lines: Adult [facility], and [facility]. Children, adults and families benefit from these proven services. Each year, Uziel Sherman serves over 31,000 clients face to face and an additional 51,000 are reached through prevention services throughout [facility]. The [facility] has two basic purposes: to improve social behavior and enhance personal recovery and growth. Uziel Sherman creates innovative, evidenced-based programs that are proven to solve tough social problems that impact all members of the community. Through comprehensive and proven solutions to behavioral health challenges, Uziel Sherman is building a stronger communityone life at a time. SERVICES  The [address] [place] [address] is comprised of three classifications of services: programs, enterprises and affiliations. The agency adheres to this model to operate more effectively and better serve the community. Jesus Franksin provides a variety of services in the areas of community corrections, mental health, substance abuse and reintergration services. [address]36 [employer] are social enterprises that 2505 [facility] 33 recovery and growth.    AFFILIATIONS - To address the realities of limited funding and the cost-containment demands of funders, Uziel Sherman developed an affiliation [address] [place] [address]  [phone]  [place] [address] partner through the Family Peer Support program, [facility] offers parents guidance and support by providing information and services tailored to meet parents needs, culture and interests. Direct services and online resources are available, and some costs are included. www.[place]. [facility]  [phone]  [address] partner through the [address] [facility] [address] Team program, [facility] has been providing quality behavioral health and forensic services for over 80 years to children, adults and families. [address] [place] [address].  [phone]  [address] partner through the ADAPT program, the [place] [address] mission is to provide community leadership and comprehensive behavioral health programs that support a strong, well functioning community and assist residents of [place] [address] and surrounding areas to achieve their greatest potential and well-being. www.[place].Farseer  The [address]  [phone] 53  partner through [place], [address]  is a private, non-profit social service agency that improves the lives of children and their families through services in four distinct areas: Adoption, Early Childhood, Education and [address]. www.[facility]. [address] [place] [address] ([place] 110)  ([phone]  [address] partner through the [place] program, CYC is comprised of two main programs: Mentoring and College Access. These programs work together to ensure the best possible outcomes for [place]'s youth. www.cycyouth. [facility] De Chris 94  [phone]  [address] partner through the Children of Incarcerated Parents program, the 33 [facility] is composed of churches and other Sabianist bodies working together to serve the community through organized ecumenical and [employer] in education, justice and chaplaincies.  Marquita 10  [phone] partner through the [address] [place] [address] Street Day Reporting program and Community Timo Grissom provides residential short-term and long-term substance abuse treatment. Programs treat adolescents, persons with co-occurring mental and substance abuse disorders, pregnant/postpartum women, and women. Dress for Success  ([phone]  Jerica Bates partner through the Workforce Reentry program, [employer] strives to advance low-income women's economic and social development and to encourage self-sufficiency through career development and employment retention. Dress for Success responds to the needs of the community by providing programs that help economically disadvantaged women acquire jobs, retain their new positions and succeed in the [employer]. http://dfsBem[facility]ji [facility]. [facility].)  ([phone]  Jerica 7 partner through the [place] Tapas MediaCritical access hospital AT [place] offers programs for individuals with disabilities, individuals with disadvantages and at-risk youth. http://I'mOK.Compufirst  First Step Home  ([phone]  Jerica 7 partner through the [address] [place] [address] is a long-term residential drug and alcohol treatment program for women and their children, serving [place]. www.[facility]. [address] [place] [address]  ([phone]  Jerica 7 partner through the [address] [place] [address] Day Reporting program and [address] offers a host of programs, including case management, residential programs, and nursing services. www.[facility]. [place] [address]  ([phone]) [phone]  The local [address] [place] [address] is a [employer] partner through the [address] [place] [address] Day Reporting program.  Berry Koch. [address] (Andres)  [phone]  Hajuan pabloststefanise 7 partner through the Workforce Reentry program, Andres is a non-partisan, nonprofit, public interest law office based in [place]. [address] [facility]  [phone]  [address] partner through the ADAPT program, it is a substance abuse treatment and residential long-term treatment for men. www.prospect-house. org  Recovery Link  25-26-70-73  [address] partner through the [employer], Recovery Link offers services such as IT support, database administration and development, and  along with group purchasing, administrative support, and bookkeeping services to [employer] in the [facility]. www.Full Genomes Corporationlink. org  Transformation Hustle/ [address]  [phone]  [address] partner through the Workforce Reentry program, [facility] vision is [facility] 5454 will be transformed into a [facility]-centered [facility] that provides refuge for all people. [employer]  [employer] of [address]  [phone]  [address] partner through the Children of Incarcerated Parents (CIP) and the Workforce Reentry programs, [employer] of [address] works to eliminate the barriers of racism and level the playing field for all  Americans and others at risk by promoting their economic self-sufficiency and entrepreneurship through Structure Vision in the areas of comprehensive employment, youth and family development, and advocacy. www.Edinburgh Molecular Imagingul. [address]  ([phone]) [phone]  [address] partner through [facility] and [address] mission is to [employer] by providing exceptional health care that improves their health and well-being. OvalBeds.no                 1202 [facility]    19 Li[address] Pratima Pearce ( takes [employer], Medicaid/Medicare )  [address] [facility].    [place], [place]. 0705 [facility] Partial Hospitalization: 2525 [facility], [facility] Ter Heun [facility]  (group services)    [address]:  [facility] 55 and 1825 [facility] ([address] [place] [address]):  [phone]  [address] Gaines, [address] ( Medicaid  patients only)    Haofangtong (takes patients no insurance and Medicaid/Medicare)   [place] 9. vd.    [address] [facility]. [address]:  [phone]  [address] [facility]  (phone services only)  [address]:  [facility]: [phone]  Nishi Crank:  [phone]  [facility] [phone]    Comprehensive Counseling Services:  [phone]  [facility], [address]:  [phone]  74 [facility] #A  [place], 328 [facility].:  [phone]  [address] [place], [place] [address]un [facility], [address]( takes Medicaid/Medicare )    [address] [place]:  [phone]  [place] (accept insurance, walk in available)    [address] [place] [address].:  [phone] (M/W/F  8-5 and T/TH 8-8)  Poornima Centeno [address] [place] [address] Ave:  [phone]  [place]ermin[place]Real[place]rip[place] [address] [address] The Price Wizardsun Revisu (accept insurance, walk-in available)    [facility] Out[facility] at [facility]:  [phone]   AgilyxEnglish Helper [address] [facility]  (Call Helon Severs on cell if needed [address] of [facility]: [phone] (insured pt only- call for list they accept)  Shivani Shipley: Outpatient, Inpatient, adolescent, adult and geriatric    Trans[facility] Living, Inc.:  [address] [place] [address] [address] [place], [place] Program:  [place] [address] [address] [facility]    Thank You for Td Lemus 91